# Patient Record
Sex: FEMALE | Race: BLACK OR AFRICAN AMERICAN | NOT HISPANIC OR LATINO | Employment: OTHER | URBAN - METROPOLITAN AREA
[De-identification: names, ages, dates, MRNs, and addresses within clinical notes are randomized per-mention and may not be internally consistent; named-entity substitution may affect disease eponyms.]

---

## 2022-01-03 ENCOUNTER — PREPPED CHART (OUTPATIENT)
Dept: URBAN - METROPOLITAN AREA CLINIC 19 | Facility: CLINIC | Age: 77
End: 2022-01-03

## 2022-05-17 ENCOUNTER — FOLLOW UP (OUTPATIENT)
Dept: URBAN - METROPOLITAN AREA CLINIC 19 | Facility: CLINIC | Age: 77
End: 2022-05-17

## 2022-05-17 DIAGNOSIS — H40.053: ICD-10-CM

## 2022-05-17 PROCEDURE — 99213 OFFICE O/P EST LOW 20 MIN: CPT

## 2022-05-17 PROCEDURE — 92083 EXTENDED VISUAL FIELD XM: CPT

## 2022-05-17 ASSESSMENT — VISUAL ACUITY
OD_SC: 20/70
OS_SC: 20/30
OD_PH: 20/40
OU_SC: 20/30

## 2022-05-17 ASSESSMENT — TONOMETRY
OS_IOP_MMHG: 15
OS_IOP_MMHG: 18
OD_IOP_MMHG: 14
OD_IOP_MMHG: 18

## 2022-09-08 ENCOUNTER — ESTABLISHED PATIENT (OUTPATIENT)
Dept: URBAN - METROPOLITAN AREA CLINIC 19 | Facility: CLINIC | Age: 77
End: 2022-09-08

## 2022-09-08 DIAGNOSIS — H52.4: ICD-10-CM

## 2022-09-08 DIAGNOSIS — H04.123: ICD-10-CM

## 2022-09-08 DIAGNOSIS — H40.053: ICD-10-CM

## 2022-09-08 DIAGNOSIS — H35.371: ICD-10-CM

## 2022-09-08 DIAGNOSIS — H43.393: ICD-10-CM

## 2022-09-08 PROCEDURE — 92014 COMPRE OPH EXAM EST PT 1/>: CPT

## 2022-09-08 ASSESSMENT — KERATOMETRY
OD_K1POWER_DIOPTERS: 42.50
OD_AXISANGLE_DEGREES: 110
OD_AXISANGLE2_DEGREES: 20
OS_K1POWER_DIOPTERS: 42.75
OS_AXISANGLE2_DEGREES: 159
OS_K2POWER_DIOPTERS: 43.50
OS_AXISANGLE_DEGREES: 069
OD_K2POWER_DIOPTERS: 43.50

## 2022-09-08 ASSESSMENT — VISUAL ACUITY
OD_PH: 20/40
OS_SC: 20/30-3
OU_SC: 20/25-3
OD_SC: 20/60

## 2022-09-08 ASSESSMENT — TONOMETRY
OS_IOP_MMHG: 17
OD_IOP_MMHG: 16

## 2023-09-18 ENCOUNTER — ESTABLISHED PATIENT (OUTPATIENT)
Dept: URBAN - METROPOLITAN AREA CLINIC 19 | Facility: CLINIC | Age: 78
End: 2023-09-18

## 2023-09-18 DIAGNOSIS — H35.371: ICD-10-CM

## 2023-09-18 DIAGNOSIS — H40.053: ICD-10-CM

## 2023-09-18 DIAGNOSIS — H43.393: ICD-10-CM

## 2023-09-18 DIAGNOSIS — H52.4: ICD-10-CM

## 2023-09-18 DIAGNOSIS — H04.123: ICD-10-CM

## 2023-09-18 PROCEDURE — 92014 COMPRE OPH EXAM EST PT 1/>: CPT

## 2023-09-18 PROCEDURE — 92250 FUNDUS PHOTOGRAPHY W/I&R: CPT

## 2023-09-18 PROCEDURE — 92015 DETERMINE REFRACTIVE STATE: CPT

## 2023-09-18 ASSESSMENT — KERATOMETRY
OS_K2POWER_DIOPTERS: 43.25
OS_AXISANGLE2_DEGREES: 60
OS_AXISANGLE_DEGREES: 150
OS_K1POWER_DIOPTERS: 44.00
OD_K1POWER_DIOPTERS: 44.00
OD_AXISANGLE_DEGREES: 15
OD_AXISANGLE2_DEGREES: 105
OD_K2POWER_DIOPTERS: 43.00

## 2023-09-18 ASSESSMENT — VISUAL ACUITY
OS_SC: 20/40-1
OS_PH: 20/30-1
OD_PH: 20/50-1
OU_SC: 20/30
OD_SC: 20/80-1

## 2023-09-18 ASSESSMENT — TONOMETRY
OS_IOP_MMHG: 13
OD_IOP_MMHG: 14

## 2024-03-25 ENCOUNTER — ESTABLISHED PATIENT (OUTPATIENT)
Dept: URBAN - METROPOLITAN AREA CLINIC 19 | Facility: CLINIC | Age: 79
End: 2024-03-25

## 2024-03-25 DIAGNOSIS — H40.053: ICD-10-CM

## 2024-03-25 PROCEDURE — 92083 EXTENDED VISUAL FIELD XM: CPT

## 2024-03-25 PROCEDURE — 99213 OFFICE O/P EST LOW 20 MIN: CPT

## 2024-03-25 PROCEDURE — 92133 CPTRZD OPH DX IMG PST SGM ON: CPT

## 2024-03-25 ASSESSMENT — VISUAL ACUITY
OD_SC: 20/50-2
OS_SC: 20/25-2
OU_SC: 20/30-1
OD_PH: 20/30-2

## 2024-03-25 ASSESSMENT — TONOMETRY
OS_IOP_MMHG: 13
OD_IOP_MMHG: 15

## 2024-09-25 ENCOUNTER — COMPREHENSIVE EXAM (OUTPATIENT)
Dept: URBAN - METROPOLITAN AREA CLINIC 19 | Facility: CLINIC | Age: 79
End: 2024-09-25

## 2024-09-25 DIAGNOSIS — H52.4: ICD-10-CM

## 2024-09-25 DIAGNOSIS — H04.123: ICD-10-CM

## 2024-09-25 DIAGNOSIS — H43.393: ICD-10-CM

## 2024-09-25 DIAGNOSIS — H35.371: ICD-10-CM

## 2024-09-25 DIAGNOSIS — H00.16: ICD-10-CM

## 2024-09-25 DIAGNOSIS — H40.053: ICD-10-CM

## 2024-09-25 PROCEDURE — 92015 DETERMINE REFRACTIVE STATE: CPT

## 2024-09-25 PROCEDURE — 92014 COMPRE OPH EXAM EST PT 1/>: CPT

## 2025-01-10 ENCOUNTER — CONSULTATION/EVALUATION (OUTPATIENT)
Age: 80
End: 2025-01-10

## 2025-01-10 DIAGNOSIS — H16.223: ICD-10-CM

## 2025-01-10 DIAGNOSIS — H00.024: ICD-10-CM

## 2025-01-10 PROCEDURE — 99214 OFFICE O/P EST MOD 30 MIN: CPT

## 2025-01-10 RX ORDER — TOBRAMYCIN / DEXAMETHASONE 3; .5 MG/ML; MG/ML
1 SUSPENSION/ DROPS OPHTHALMIC TWICE A DAY
Start: 2025-01-10

## 2025-04-08 ENCOUNTER — FOLLOW UP (OUTPATIENT)
Age: 80
End: 2025-04-08

## 2025-04-08 DIAGNOSIS — H40.053: ICD-10-CM

## 2025-04-08 DIAGNOSIS — H00.024: ICD-10-CM

## 2025-04-08 PROCEDURE — 92083 EXTENDED VISUAL FIELD XM: CPT

## 2025-04-08 PROCEDURE — 99213 OFFICE O/P EST LOW 20 MIN: CPT

## 2025-04-08 PROCEDURE — 92133 CPTRZD OPH DX IMG PST SGM ON: CPT

## 2025-04-08 RX ORDER — NEOMYCIN SULFATE, POLYMYXIN B SULFATE AND DEXAMETHASONE 3.5; 10000; 1 MG/G; [USP'U]/G; MG/G: OINTMENT OPHTHALMIC TWICE A DAY
